# Patient Record
Sex: FEMALE | Race: WHITE | NOT HISPANIC OR LATINO | Employment: FULL TIME | URBAN - METROPOLITAN AREA
[De-identification: names, ages, dates, MRNs, and addresses within clinical notes are randomized per-mention and may not be internally consistent; named-entity substitution may affect disease eponyms.]

---

## 2021-10-25 ENCOUNTER — TELEPHONE (OUTPATIENT)
Dept: FAMILY MEDICINE | Facility: CLINIC | Age: 36
End: 2021-10-25

## 2021-10-25 NOTE — TELEPHONE ENCOUNTER
Patient states she has no preference which OB she goes to. As long as provider is a female. Patient scheduled w/ Dr. Olvera on 11/05 at 7:50 as Dr. Olvera's schedules in clinic works better with patient.

## 2021-10-25 NOTE — TELEPHONE ENCOUNTER
Reason for Call:  Other appointment    Detailed comments: Pregnancy confirmation appointment is needed and she will be a new patient as well.  Last day of her last cycle was 9/19 and she did multiple at home test last week that were all positive     Phone Number Patient can be reached at: Home number on file [phone] (home)    Best Time: any    Can we leave a detailed message on this number? YES    Call taken on 10/25/2021 at 3:15 PM by Maria A Delgadillo

## 2021-11-21 ENCOUNTER — HEALTH MAINTENANCE LETTER (OUTPATIENT)
Age: 36
End: 2021-11-21

## 2021-11-29 ENCOUNTER — NURSE TRIAGE (OUTPATIENT)
Dept: NURSING | Facility: CLINIC | Age: 36
End: 2021-11-29
Payer: COMMERCIAL

## 2021-11-29 NOTE — TELEPHONE ENCOUNTER
Patient has first OB appointment tomorrow at [facility].    Cramping and bleeding.Patient reports she has noticed about a half of a teaspoon of blood on the toilet tissue today after urinating. She reports she is 11weeks   Pregnant .    Patient advised this is normal, and to discuss with PCP at appointment tomorrow.    Lidia Villegas RN RN  [facility] Triage/refill nurse      Reason for Disposition    SPOTTING (single or brief episode)    Additional Information    Negative: Shock suspected (e.g., cold/pale/clammy skin, too weak to stand, low BP, rapid pulse)    Negative: Difficult to awaken or acting confused (e.g., disoriented, slurred speech)    Negative: Passed out (i.e., fainted, collapsed and was not responding)    Negative: Sounds like a life-threatening emergency to the triager    Negative: Vaginal bleeding and pregnant > 20 weeks    Negative: Not pregnant or pregnancy status unknown    Negative: SEVERE abdominal pain (e.g., excruciating)    Negative: SEVERE vaginal bleeding (e.g., soaking 2 pads / hour, large blood clots) and present for 2 or more hours    Negative: SEVERE dizziness (e.g., unable to stand, requires support to walk, feels like passing out)    Negative: MODERATE vaginal bleeding (e.g., soaking 1 pad) and present > 6 hours    Negative: MODERATE vaginal bleeding (e.g., soaking 1 pad / hour, clots) and pregnant > 12 weeks    Negative: Passed tissue (e.g., gray-white)    Negative: Shoulder pain    Negative: Constant abdominal pain lasting > 1 hour    Negative: Fever > 100.4 F (38.0 C)    Negative: Pale skin (pallor) of new onset or worsening    Negative: Patient sounds very sick or weak to the triager    Negative: MODERATE vaginal bleeding (e.g., soaking 1 pad / hour; clots)    Negative: Intermittent lower abdominal pain (e.g., cramping) lasting > 24 hours    Negative: Pain or burning with passing urine (urination)    Negative: Prior history of 'ectopic pregnancy' or previous tubal surgery  (e.g., tubal ligation)    Negative: Patient wants to be seen    Negative: MILD vaginal bleeding (i.e., less than 1 pad / hour; less than patient's usual menstrual bleeding; not just spotting)    Negative: SPOTTING lasting > 48 hours or spotting happens more than once in a week    Negative: Has IUD    Negative: Unusual vaginal discharge (e.g., bad smelling, yellow, green, or foamy-white)    Negative: Not feeling pregnant any longer (e.g., breast tenderness or nausea has disappeared)    Protocols used: PREGNANCY - VAGINAL BLEEDING LESS THAN 20 WEEKS EGA-A-OH

## 2021-11-30 ENCOUNTER — PRENATAL OFFICE VISIT (OUTPATIENT)
Dept: FAMILY MEDICINE | Facility: CLINIC | Age: 36
End: 2021-11-30
Payer: COMMERCIAL

## 2021-11-30 ENCOUNTER — HOSPITAL ENCOUNTER (OUTPATIENT)
Dept: ULTRASOUND IMAGING | Facility: CLINIC | Age: 36
Discharge: HOME | End: 2021-11-30
Attending: FAMILY MEDICINE | Admitting: FAMILY MEDICINE
Payer: COMMERCIAL

## 2021-11-30 VITALS
DIASTOLIC BLOOD PRESSURE: 60 MMHG | HEART RATE: 76 BPM | BODY MASS INDEX: 30.55 KG/M2 | SYSTOLIC BLOOD PRESSURE: 94 MMHG | WEIGHT: 190.1 LBS | HEIGHT: 66 IN

## 2021-11-30 DIAGNOSIS — F41.8 POSTPARTUM ANXIETY: ICD-10-CM

## 2021-11-30 DIAGNOSIS — E89.0 POST-SURGICAL HYPOTHYROIDISM: ICD-10-CM

## 2021-11-30 DIAGNOSIS — O09.529 SUPERVISION OF HIGH-RISK PREGNANCY OF ELDERLY MULTIGRAVIDA: Primary | ICD-10-CM

## 2021-11-30 DIAGNOSIS — O09.529 SUPERVISION OF HIGH-RISK PREGNANCY OF ELDERLY MULTIGRAVIDA: ICD-10-CM

## 2021-11-30 DIAGNOSIS — O20.0 MISCARRIAGE, THREATENED, EARLY PREGNANCY: ICD-10-CM

## 2021-11-30 DIAGNOSIS — O20.9 FIRST TRIMESTER BLEEDING: ICD-10-CM

## 2021-11-30 PROBLEM — E03.9 HYPOTHYROIDISM: Status: ACTIVE | Noted: 2020-10-13

## 2021-11-30 PROBLEM — K58.9 IBS (IRRITABLE BOWEL SYNDROME): Status: ACTIVE | Noted: 2021-11-30

## 2021-11-30 LAB — HCG UR QL: POSITIVE

## 2021-11-30 PROCEDURE — 87086 URINE CULTURE/COLONY COUNT: CPT | Performed by: FAMILY MEDICINE

## 2021-11-30 PROCEDURE — 76801 OB US < 14 WKS SINGLE FETUS: CPT

## 2021-11-30 PROCEDURE — 90686 IIV4 VACC NO PRSV 0.5 ML IM: CPT | Performed by: FAMILY MEDICINE

## 2021-11-30 PROCEDURE — 81025 URINE PREGNANCY TEST: CPT | Performed by: FAMILY MEDICINE

## 2021-11-30 PROCEDURE — 90471 IMMUNIZATION ADMIN: CPT | Performed by: FAMILY MEDICINE

## 2021-11-30 PROCEDURE — 99215 OFFICE O/P EST HI 40 MIN: CPT | Mod: 25 | Performed by: FAMILY MEDICINE

## 2021-11-30 RX ORDER — LEVOTHYROXINE SODIUM 150 UG/1
150 TABLET ORAL DAILY
COMMUNITY
Start: 2021-10-19

## 2021-11-30 RX ORDER — CHOLECALCIFEROL (VITAMIN D3) 50 MCG
1 TABLET ORAL DAILY
Qty: 90 TABLET | Refills: 3
Start: 2021-11-30

## 2021-11-30 RX ORDER — SERTRALINE HYDROCHLORIDE 25 MG/1
25 TABLET, FILM COATED ORAL DAILY
COMMUNITY
Start: 2021-11-21

## 2021-11-30 ASSESSMENT — ANXIETY QUESTIONNAIRES
3. WORRYING TOO MUCH ABOUT DIFFERENT THINGS: NOT AT ALL
5. BEING SO RESTLESS THAT IT IS HARD TO SIT STILL: NOT AT ALL
IF YOU CHECKED OFF ANY PROBLEMS ON THIS QUESTIONNAIRE, HOW DIFFICULT HAVE THESE PROBLEMS MADE IT FOR YOU TO DO YOUR WORK, TAKE CARE OF THINGS AT HOME, OR GET ALONG WITH OTHER PEOPLE: NOT DIFFICULT AT ALL
4. TROUBLE RELAXING: NOT AT ALL
7. FEELING AFRAID AS IF SOMETHING AWFUL MIGHT HAPPEN: SEVERAL DAYS
GAD7 TOTAL SCORE: 2
1. FEELING NERVOUS, ANXIOUS, OR ON EDGE: NOT AT ALL
2. NOT BEING ABLE TO STOP OR CONTROL WORRYING: NOT AT ALL
6. BECOMING EASILY ANNOYED OR IRRITABLE: SEVERAL DAYS

## 2021-11-30 ASSESSMENT — MIFFLIN-ST. JEOR: SCORE: 1551.91

## 2021-11-30 NOTE — PROGRESS NOTES
SUBJECTIVE:    36 year old, female  advance maternal agae   who presents to the clinic today for a new ob visit.    Feels well but started spotting cramping 3 days ago currently no bleeding or cramping . Has started PNV.  Estimated Date of Delivery:  is calculated from Patient's last menstrual period was 09/15/2021 (exact date)..    She has had bleeding since her LMP.  The bleeding  is dark red and brown, in small, on 3  occasions, and was accompanied by cramping...   She has had mild nausea. Weight loss has not occurred.   This was a planned pregnancy.   FOB is involved,         OTHER CONCERNS: hypothyroidism and generalized anxiety disorder which is well controlled currently on medication     ===========================================  ROS  PSYCHIATRIC:  Denies post partum depression  PHQ9: Last PHQ-9 score on record= No Value exists for the : HP#PHQ9  Social History     Tobacco Use     Smoking status: Former Smoker     Types: Cigarettes     Smokeless tobacco: Never Used     Tobacco comment: Cigarette smoker in college years   Substance Use Topics     Alcohol use: Not on file     History   Drug Use Not on file     History   Smoking Status     Former Smoker     Types: Cigarettes   Smokeless Tobacco     Never Used     Comment: Cigarette smoker in college years     Social History    Substance and Sexual Activity      Alcohol use: Not on file    History reviewed. No pertinent family history.  ============================================  MEDICAL HISTORY   No Known Allergies    [unfilled]      Current Outpatient Medications:      levothyroxine (SYNTHROID/LEVOTHROID) 150 MCG tablet, Take 150 mcg by mouth daily, Disp: , Rfl:      Prenatal Vit-Fe Fumarate-FA (PNV FOLIC ACID + IRON) 27-1 MG TABS, Take 1 tablet by mouth daily, Disp: 90 tablet, Rfl: 0     sertraline (ZOLOFT) 25 MG tablet, Take 25 mg by mouth daily, Disp: , Rfl:      vitamin D3 (CHOLECALCIFEROL) 50 mcg (2000 units)  "tablet, Take 1 tablet (50 mcg) by mouth daily, Disp: 90 tablet, Rfl: 3    History reviewed. No pertinent past medical history.    History reviewed. No pertinent surgical history.         OB History    Para Term  AB Living   2 1 1 0 0 1   SAB IAB Ectopic Multiple Live Births   0 0 0 0 1      # Outcome Date GA Lbr Tobi/2nd Weight Sex Delivery Anes PTL Lv   2 Current            1 Term 19 41w0d  4.167 kg (9 lb 3 oz) M  EPI N ELISABETH      Birth Comments: delivered in [facility] close to [place]       Complications: Dysfunctional Labor      Name: Juan Carlos Enrique John        GYN History- NO  Abnormal Pap Smears per patient                         Cervical procedures: none                         History of STI: none     I personally reviewed the past social/family/medical and surgical history on the date of service.   I reviewed lab work done at Intake visit with patient.    OBJECTIVE:   PHYSICAL EXAM:  BP 94/60 (BP Location: Left arm, Patient Position: Sitting, Cuff Size: Adult Regular)   Pulse 76   Ht 1.665 m (5' 5.55\")   Wt 86.2 kg (190 lb 1.6 oz)   LMP 09/15/2021 (Exact Date)   Breastfeeding No   BMI 31.10 kg/m    BMI- Body mass index is 31.1 kg/m ., GENERAL:  Pleasant pregnant female, alert, cooperative  and well groomed.    PHYSICAL EXAM  General: Alert, no acute distress.   EYES normocephalic conjunctivae are trevon,   EAR Normal pearly TMs bilaterally without erythema, pus or fluid  Nose is clear.  Oropharynx is moist and clear,   Neck: supple without adenopathy or thyromegaly.  Lungs: Good aeration bilaterally.Clear to auscultation without wheezes, rales or rhonci.    Heart: regular rate and rhythm, normal S1 and S2, no murmurs  Pelvic exam: no pelvic exam done with history of bleeding like to do ultrasound first   Skin: clear without rash or lesions  Neuro: normal muscle tone in all 4 extremities, deep tendon reflexes 2+ symmetrically at the patella     Intrauterine pregnancy  10w6d  "     Genetic Screening: First Trimester Screen    Marga was seen today for confirmation of pregnancy and establish care.    Diagnoses and all orders for this visit:    Supervision of high-risk pregnancy of elderly multigravida  Comments:  will do prenatal lab and NIPT test next wk after conformation of pregnancy and dates by next wk , will be coming back for lab only  Orders:  -     ABO/Rh type and screen; Future  -     Hepatitis B surface antigen; Future  -     CBC with platelets; Future  -     HIV Antigen Antibody Combo; Future  -     Rubella Antibody IgG; Future  -     Treponema Abs w Reflex to RPR and Titer; Future  -     Urine Culture Aerobic Bacterial; Future  -     Hepatitis C antibody; Future  -     HCG qualitative urine; Future  -     TSH with free T4 reflex; Future  -     US OB <14 Weeks w Transvaginal Single; Future  -     INFLUENZA VACCINE IM >6 MO VALENT IIV4 (ALFURIA/FLUZONE)  -     Other Laboratory; [facility]; NIPT (Laboratory Miscellaneous Order); Future  -     Prenatal Vit-Fe Fumarate-FA (PNV FOLIC ACID + IRON) 27-1 MG TABS; Take 1 tablet by mouth daily  -     vitamin D3 (CHOLECALCIFEROL) 50 mcg (2000 units) tablet; Take 1 tablet (50 mcg) by mouth daily  -     Urine Culture Aerobic Bacterial  -     HCG qualitative urine    Postpartum anxiety  Comments:  continue zoloft at 25 mg working well    Post-surgical hypothyroidism  Comments:  currently taking 150 mcg daily with one extra pill on sunday ( total dose 8 pills ), like to keep TSH level less then 2.5 last check 10 days ago when dose incre    First trimester bleeding  Comments:  started 3 days ago with cramping, no sex or any other trauma , no bleeding or cramping  today just spotting , will do OB ultrasound to conform FHT , if no concern will do PRENATAL lab and NIPT in one wk    Other orders  -     DE FLU VAC PRESRV FREE QUAD SPLIT VIR IM  MONTHS IM         PLAN:  Orders Placed This Encounter   Procedures     US OB <14 Weeks w Transvaginal Single      INFLUENZA VACCINE IM >6 MO VALENT IIV4 (ALFURIA/FLUZONE)     OH FLU VAC PRESRV FREE QUAD SPLIT VIR IM  MONTHS IM     Hepatitis B surface antigen     CBC with platelets     HIV Antigen Antibody Combo     Rubella Antibody IgG     Treponema Abs w Reflex to RPR and Titer     Hepatitis C antibody     HCG qualitative urine     TSH with free T4 reflex     Other Laboratory; [facility]; NIPT (Laboratory Miscellaneous Order)     Orders Placed This Encounter   Medications     levothyroxine (SYNTHROID/LEVOTHROID) 150 MCG tablet     Sig: Take 150 mcg by mouth daily     sertraline (ZOLOFT) 25 MG tablet     Sig: Take 25 mg by mouth daily     Prenatal Vit-Fe Fumarate-FA (PNV FOLIC ACID + IRON) 27-1 MG TABS     Sig: Take 1 tablet by mouth daily     Dispense:  90 tablet     Refill:  0     vitamin D3 (CHOLECALCIFEROL) 50 mcg (2000 units) tablet     Sig: Take 1 tablet (50 mcg) by mouth daily     Dispense:  90 tablet     Refill:  3     - Reviewed use of triage nurse line and contacting the on-call provider after hours for an urgent need such as fever, vagina bleeding, bladder or vaginal infection, rupture of membranes,  or term labor.    - Reviewed best evidence for: weight gain for her weight and height for pregnancy:  RECOMMENDED WEIGHT GAIN: 15-25 lbs.   healthy diet and foods to avoid; exercise and activity during pregnancy;avoiding exposure to toxoplasmosis; and maintenance of a generally healthy lifestyle.   - Discussed the harms, benefits, side effects and alternative therapies for current prescribed and OTC medications.    - All pt's and FOB's  questions discussed and answered.  Pt verbalized understanding of and agreement to plan of care.     - Continue scheduled prenatal care and prn if questions or concerns    Shannan Parker MD 2021 7:35 AM   [facility].  [phone]

## 2021-11-30 NOTE — PATIENT INSTRUCTIONS
Doctor to patient education points discussed during this prenatal visit:    Dear [unfilled]  Congratulations !!  You are currently at 10w6d based on you Patient's last menstrual period was 09/15/2021 (exact date). and we calculate your Estimated Date of Delivery: Jun 22, 2022   At today's visit we ordered  basic prenatal labs, will order dating ultrasound which usually done between 6-8wk of gestational age , order prenatal vitamin and also if needed medications to help nausea/vomiting     Timeline for prenatal visits: I will see you every 4 weeks until 28 weeks, then every 2- weeks until 36 weeks, then every week until 40 weeks (your due date). If you would like to book more than one visit in advance, you can do so , then you can have more preferred appointment times.    Sign up for weekly parenting E mail   Reference it frequently during  your pregnancy and ask me any questions that arise from the book.      -  Hospital: [facility] [place] [facility] Hospital Website:       -  Doctor at delivery: The plan is for me to follow you through your pregnancy and be there at your delivery.  If I am out of town, or unavailable, I work in a group of about 15 doctors and someone from that group will cover for me.  But, again, the goal is for me to be at your delivery.    -  Timeline for prenatal visits: I will see you every 4 weeks until 28 weeks, then every 2 weeks until 36 weeks, then every week until 40 weeks (your due date).  If you would like to book more than one visit in advance, you can do that so that you have your preferred appointment times.    -  Nutrition: Some people say that when you are pregnant you are eating for two.  Really, the amount of calories needed when pregnant is not that much greater than when not pregnant.  As a result, I recommend focusing on eating as healthy as possible and your body will make sure the baby gets the  "necessary nutrients.     -  Weight gain in pregnancy: The amount of weight to gain in pregnancy depends on a patient's before becoming pregnant.  The usual suggestion is to gain between 20 to 25 pounds.  If you weigh more than the suggested weight for your height prior to getting pregnant, it is suggested that you gain less weight in pregnancy (approximately 15 to 25 pounds).  You gain most of your weight in the second (starts at 14 weeks) and third (starts at 28 weeks) trimesters.    -  Vitamins: It is important to take a prenatal vitamin daily during the pregnancy and continue this after birth if breast feeding.    -  Exercise: It is important to stay active during your pregnancy.   You don't need to buy a gym membership or do intense exercise.  Something as simple as getting out on a walk every day will help.  Labor can be a lot of work, so staying active during your pregnancy will help you be in the right condition for your labor.        -  Ultrasounds: One ultrasound that all pregnant women get is at about 20 weeks to look at the baby's anatomy (brain, heart, kidneys, etc) and growth.  Sometimes we have to do an ultrasound early in pregnancy to confirm the due date.  Sometimes we have to do other ultrasounds in pregnancy if issues arise.      -  Hazards: Avoid smoking, alcohol and recreational drugs during pregnancy.  Avoid overheating (for example with hot tubs or summer heat).  Don't change a cat's litter box due to a potential bacterial that can be in the litter box called toxoplasma.  Avoid certain foods due to the risk of different types of bacteria: raw meat and unpasteurized milk (so milk from the grocery store is okay but not straight from a farm).    -  A special word about cheeses: some soft cheeses (such as feta, Brie, Camembert, blue-veined cheese, and Mexican-style cheese like \"queso key fresco\") are made from unpasteurized milk (also called raw milk) and can carry disease-causing bacteria like " listeria.  The CDC recommends not eating the above cheeses while pregnant.  Always check the label before eating soft cheese to be sure it's pasteurized. If you have any doubt (for example, if it's served at a party and you can't look at the package) don't eat it.  Cottage cheese, ricotta, cream cheese, mozzarella, processed cheese (such as American), and hard cheese (such as cheddar and Parmesan), as well as cultured dairy products like yogurt and buttermilk, are generally considered safe, either because they re made with pasteurized milk or because they're processed in ways that help inhibit the growth of the bacteria.    -  Deli meats: It is recommended that deli meats, lunch meats and hot dogs are all eaten hot (heated up to be steaming hot) and not at refrigerated or room temperatures.    -  Concerning symptoms: If you have any of the following things, contact me or my clinic: vaginal bleeding, cramping that won't go away, abdominal / belly pain, burning with urination, or anything other symptoms that concern you.    -  Medications in pregnancy: it is desired not to give medications during pregnancy, but many times we have to.  Some medications are safer than others.  All over the counter medications will tell you to ask your doctor about the medication if you are pregnant.  We will give you a list of medications that [facility] considers safe during pregnancy.  If the medication is on that list, you may say that you have asked your doctor.      EARLY TESTING OPTIONS TO DETERMINE THE RISK FOR CHROMOSOMAL ABNORMALITY   Testing Options:   We discussed the following options:               Non-invasive Pren[facility] Testing ([facility])t ( [facility] ,Please call your insurance always to determine the coverage     Maternal plasma cell-free DNA testing; first trimester ultrasound with nuchal translucency and nasal bone assessment is recommended, when appropriate    Screens for fetal trisomy 21, trisomy 13, trisomy 18, and sex  chromosome aneuploidy    Cannot screen for open neural tube defects; maternal serum AFP after 15 weeks is recommended                  Genetic Amniocentesis    Invasive procedure typically performed in the second trimester by which amniotic fluid is obtained for the purpose of chromosome analysis and/or other prenatal genetic analysis    Diagnostic results; >99% sensitivity for fetal chromosome abnormalities    AFAFP measurement tests for open neural tube defects           Comprehensive (Level II) ultrasound: Detailed ultrasound performed between 18-22 weeks gestation to screen for major birth defects and markers for aneuploidy.        We reviewed the benefits and limitations of this testing.  Screening tests provide a risk assessment specific to the pregnancy for certain fetal chromosome abnormalities, but cannot definitively diagnose or exclude a fetal chromosome abnormality.  Follow-up genetic counseling and consideration of diagnostic testing is recommended with any abnormal screening result.      Diagnostic tests carry inherent risks- including risk of miscarriage- that require careful consideration.  These tests can detect fetal chromosome abnormalities with greater than 99% certainty.  Results can be compromised by maternal cell contamination or mosaicism, and are limited by the resolution of cytogenetic G-banding technology.  There is no screening nor diagnostic test that can detect all forms of birth defects or mental disability.      This is optional testing and the decision to do this or not do this is different for everyone.  There is not one correct choice for everyone.  The right choice for you is the choice that best suits you.  I help guide a patient to make  right choice for her by reviewing her risk factors and her family history   For example, the risk for down syndrome increases with age, >35 higher risk than a 25 year old patient.   Shannan Parker MD

## 2021-12-01 LAB — BACTERIA UR CULT: NO GROWTH

## 2021-12-01 ASSESSMENT — PATIENT HEALTH QUESTIONNAIRE - PHQ9: SUM OF ALL RESPONSES TO PHQ QUESTIONS 1-9: 3

## 2021-12-03 ENCOUNTER — NURSE TRIAGE (OUTPATIENT)
Dept: NURSING | Facility: CLINIC | Age: 36
End: 2021-12-03
Payer: COMMERCIAL

## 2021-12-03 ENCOUNTER — HOSPITAL ENCOUNTER (EMERGENCY)
Facility: CLINIC | Age: 36
Discharge: HOME | End: 2021-12-03
Attending: STUDENT IN AN ORGANIZED HEALTH CARE EDUCATION/TRAINING PROGRAM | Admitting: STUDENT IN AN ORGANIZED HEALTH CARE EDUCATION/TRAINING PROGRAM
Payer: COMMERCIAL

## 2021-12-03 ENCOUNTER — APPOINTMENT (OUTPATIENT)
Dept: ULTRASOUND IMAGING | Facility: CLINIC | Age: 36
End: 2021-12-03
Payer: COMMERCIAL

## 2021-12-03 VITALS
DIASTOLIC BLOOD PRESSURE: 63 MMHG | OXYGEN SATURATION: 97 % | HEART RATE: 84 BPM | RESPIRATION RATE: 18 BRPM | TEMPERATURE: 97.8 F | BODY MASS INDEX: 31.09 KG/M2 | WEIGHT: 190 LBS | SYSTOLIC BLOOD PRESSURE: 105 MMHG

## 2021-12-03 DIAGNOSIS — N93.9 VAGINAL BLEEDING: ICD-10-CM

## 2021-12-03 DIAGNOSIS — O03.9 MISCARRIAGE: ICD-10-CM

## 2021-12-03 LAB
ABO/RH(D): NORMAL
ALBUMIN UR-MCNC: 10 MG/DL
ANTIBODY SCREEN: NEGATIVE
APPEARANCE UR: CLEAR
BILIRUB UR QL STRIP: NEGATIVE
COLOR UR AUTO: YELLOW
ERYTHROCYTE [DISTWIDTH] IN BLOOD BY AUTOMATED COUNT: 12.4 % (ref 10–15)
GLUCOSE UR STRIP-MCNC: NEGATIVE MG/DL
HCG SERPL-ACNC: 3900 MLU/ML (ref 0–4)
HCT VFR BLD AUTO: 35.2 % (ref 35–47)
HGB BLD-MCNC: 12.3 G/DL (ref 11.7–15.7)
HGB BLD-MCNC: 12.3 G/DL (ref 11.7–15.7)
HGB UR QL STRIP: ABNORMAL
INR PPP: 1.01 (ref 0.85–1.15)
KETONES UR STRIP-MCNC: NEGATIVE MG/DL
LEUKOCYTE ESTERASE UR QL STRIP: NEGATIVE
MCH RBC QN AUTO: 32.2 PG (ref 26.5–33)
MCHC RBC AUTO-ENTMCNC: 34.9 G/DL (ref 31.5–36.5)
MCV RBC AUTO: 92 FL (ref 78–100)
MUCOUS THREADS #/AREA URNS LPF: PRESENT /LPF
NITRATE UR QL: NEGATIVE
PH UR STRIP: 6 [PH] (ref 5–7)
PLATELET # BLD AUTO: 189 10E3/UL (ref 150–450)
RBC # BLD AUTO: 3.82 10E6/UL (ref 3.8–5.2)
RBC URINE: 77 /HPF
SP GR UR STRIP: 1.03 (ref 1–1.03)
SPECIMEN EXPIRATION DATE: NORMAL
SQUAMOUS EPITHELIAL: <1 /HPF
UROBILINOGEN UR STRIP-MCNC: <2 MG/DL
WBC # BLD AUTO: 9.6 10E3/UL (ref 4–11)
WBC URINE: 2 /HPF

## 2021-12-03 PROCEDURE — 84702 CHORIONIC GONADOTROPIN TEST: CPT | Performed by: PHYSICIAN ASSISTANT

## 2021-12-03 PROCEDURE — 81001 URINALYSIS AUTO W/SCOPE: CPT | Performed by: PHYSICIAN ASSISTANT

## 2021-12-03 PROCEDURE — 36415 COLL VENOUS BLD VENIPUNCTURE: CPT | Performed by: PHYSICIAN ASSISTANT

## 2021-12-03 PROCEDURE — 85027 COMPLETE CBC AUTOMATED: CPT | Performed by: PHYSICIAN ASSISTANT

## 2021-12-03 PROCEDURE — 36415 COLL VENOUS BLD VENIPUNCTURE: CPT | Performed by: STUDENT IN AN ORGANIZED HEALTH CARE EDUCATION/TRAINING PROGRAM

## 2021-12-03 PROCEDURE — 76801 OB US < 14 WKS SINGLE FETUS: CPT

## 2021-12-03 PROCEDURE — 99284 EMERGENCY DEPT VISIT MOD MDM: CPT | Mod: 25

## 2021-12-03 PROCEDURE — 86901 BLOOD TYPING SEROLOGIC RH(D): CPT | Performed by: PHYSICIAN ASSISTANT

## 2021-12-03 PROCEDURE — 85018 HEMOGLOBIN: CPT | Mod: 59 | Performed by: STUDENT IN AN ORGANIZED HEALTH CARE EDUCATION/TRAINING PROGRAM

## 2021-12-03 PROCEDURE — 85610 PROTHROMBIN TIME: CPT | Performed by: PHYSICIAN ASSISTANT

## 2021-12-03 NOTE — TELEPHONE ENCOUNTER
Patient says she is having a miscarriage and is having intense vaginal bleeding. Patient says the bleeding started during the night, and is soaking one pad an hour along with severe abdominal pain. Triage guidelines recommend to go to ED now. Caller verbalized and understands directives.  COVID 19 Nurse Triage Plan/Patient Instructions    Please be aware that novel coronavirus (COVID-19) may be circulating in the community. If you develop symptoms such as fever, cough, or SOB or if you have concerns about the presence of another infection including coronavirus (COVID-19), please contact your health care provider or visit [email or web address].     Disposition/Instructions    ED Visit recommended. Follow protocol based instructions.     Bring Your Own Device:  Please also bring your smart device(s) (smart phones, tablets, laptops) and their charging cables for your personal use and to communicate with your care team during your visit.    Thank you for taking steps to prevent the spread of this virus.  o Limit your contact with others.  o Wear a simple mask to cover your cough.  o Wash your hands well and often.    Resources    [facility]: About COVID-19: [email or web address]    CDC: What to Do If You're Sick: www.cdc.gov/coronavirus/2019-ncov/about/steps-when-sick.html    CDC: Ending Home Isolation: www.cdc.gov/coronavirus/2019-ncov/hcp/disposition-in-home-patients.html     CDC: Caring for Someone: www.cdc.gov/coronavirus/2019-ncov/if-you-are-sick/care-for-someone.html     [facility]: Interim Guidance for Hospital Discharge to Home: www.health.[email or web address].mn.us/diseases/coronavirus/hcp/hospdischarge.pdf    [facility] clinical trials (COVID-19 research studies): [email or web address]     Below are the COVID-19 hotlines at the [facility] ([facility]). Interpreters are available.   o For health questions: Call 880-168-4173 or 1-384.615.4769 (7 a.m. to 7  p.m.)  o For questions about schools and childcare: Call 775-002-4141 or 1-118.601.3321 (7 a.m. to 7 p.m.)                     Reason for Disposition    SEVERE abdominal pain    MODERATE vaginal bleeding (e.g., soaking 1 pad per hour; clots) and present > 6 hours    Additional Information    Negative: Shock suspected (e.g., cold/pale/clammy skin, too weak to stand, low BP, rapid pulse)    Negative: Difficult to awaken or acting confused (e.g., disoriented, slurred speech)    Negative: Passed out (i.e., lost consciousness, collapsed and was not responding)    Negative: Sounds like a life-threatening emergency to the triager    Negative: Threatened miscarriage (threatened ) suspected and has not been examined by a HCP    Negative: Vaginal bleeding is main symptom and more than 4 weeks since medical visit    Negative: Abdomen pain is main symptom and NO vaginal bleeding in past 24 hours    Negative: Not pregnant or pregnancy status unknown    Negative: SEVERE vaginal bleeding (e.g., soaking 2 pads per hour, large blood clots) and present 2 or more hours    Protocols used:  - THREATENED MISCARRIAGE FOLLOW-UP CALL-A-OH

## 2021-12-03 NOTE — ED TRIAGE NOTES
Pt presents to the ED with c/o vaginal bleeding and cramps since last night. Pt states bleeding started 11/27/21 but  was more severe last night with clots and today has improved. Pt states she was 11 weeks pregnant and had ultrasound done Monday of this week.

## 2021-12-04 NOTE — ED PROVIDER NOTES
EMERGENCY DEPARTMENT ENCOUNTER       ED Course & Medical Decision Making     Final Impression  36 year old female presents for evaluation of vaginal bleeding in setting of suspected ongoing miscarriage. Patient had her first OB appointment and ultrasound on 11/30, believes she was about 10-11 weeks by LMP, though the ultrasound returned showing a gestational sac measuring 7 weeks, though did not have any visible fetal pole, suggestive of anembryonic pregnancy. Patient states that her OB doctor did discuss this result with her. Has had some spotting since 11/27, increased to more significant bleeding last night where she soaked through multiple pads. Has passed a fairly large clots since arriving in the ED. Bleeding improved for a while, has now filled another pad, though denies feeling lightheaded or dizzy. Appears hemodynamically stable on exam, not tachycardic. Initial hemoglobin 12.3 at about 1:20 PM. Rechecked at 7:30 PM and remained at 12.3. Pelvic ultrasound showed complex heterogeneous debris in the endometrium consistent with clot, though this was before patient passed a large clot in the ED. Clinically well-appearing. Hemoglobin appears stable, patient self remains hemodynamically stable. Suspect she has passed large amount of clot, though did  patient that she will likely have continued bleeding for several days. Spent time at bedside discussing specific return precautions including significantly worsening bleeding, lightheadedness, dizziness, syncope with patient and her significant other. Will discharge home.    Prior to making a final disposition on this patient the results of patient's tests and other diagnostic studies were discussed with the patient. All questions were answered. Patient expressed understanding of the plan and was amenable to it.    Medications - No data to display    Final Impression     1. Miscarriage    2. Vaginal bleeding      Chief Complaint     Chief Complaint   Patient  presents with     Miscarriage     Vaginal Bleeding     Pt presents to the ED with c/o vaginal bleeding and cramps since last night. Pt states bleeding started 21 but  was more severe last night with clots and today has improved. Pt states she was 11 weeks pregnant and had ultrasound done Monday of this week.    PATEL Blue is a  36 year old female with a history of hypothyroidism and IBS who presents for evaluation of vaginal bleeding.    Per chart review, patient was seen by her OB/GYN on  where she noted dark red/brown spotting and abdominal cramping that began three days prior. Her last menstrual period was on 9/15/2021. Urine hCG was positive at that time. US was obtained and showed a single intrauterine gestational sac with estimated gestational age of 7 weeks 0 days without identifiable fetal pole, possibly representing an anembryonic pregnancy.     Patient reports vaginal bleeding that began on . It started as light bleeding when wiping but progressively increased. Last night patient reports a significant amount of bleeding, soaking through several pads, her pants, and her mattress pad. She endorses associated diffuse cramping abdominal pain, fatigue, nausea, diarrhea, constipation, and intermittent chills. Her last bowel movement was earlier today. Denies vomiting, fever, cough, or rhinorrhea. Patient states her bleeding had decreased throughout today, however it has since increased again since being here in the ED. She reports soaking through one pad in the last 45 minutes as well as passing a large clot.     I, Vanda Trammell am serving as a scribe to document services personally performed by Dr. Bacilio Paniagua MD, based on my observation and the provider's statements to me. I, Dr. Bacilio Paniagua MD attest that Vanda Trammell is acting in a scribe capacity, has observed my performance of the services and has documented them in accordance with my direction.    Past  Medical History     No past medical history on file.  No past surgical history on file.  No family history on file.   Social History     Tobacco Use     Smoking status: Former Smoker     Types: Cigarettes     Smokeless tobacco: Never Used     Tobacco comment: Cigarette smoker in college years   Substance Use Topics     Alcohol use: Not on file     Drug use: Not on file       Relevant past medical, surgical, family and social history as documented above, has been reviewed and discussed with patient. No changes or additions, unless otherwise noted in the HPI.    Current Medications     levothyroxine (SYNTHROID/LEVOTHROID) 150 MCG tablet  Prenatal Vit-Fe Fumarate-FA (PNV FOLIC ACID + IRON) 27-1 MG TABS  sertraline (ZOLOFT) 25 MG tablet  vitamin D3 (CHOLECALCIFEROL) 50 mcg (2000 units) tablet      Allergies     No Known Allergies    Review of Systems     Constitutional: Endorses chills and fatigue. Denies fever  Eyes: Denies visual changes  Respiratory: Denies shortness of breath    Cardiovascular: Denies chest pain  GI: Endorses diffuse abdominal pain, nausea, diarrhea, and constipation. Denies vomiting or dark, bloody stools  : Endorses vaginal bleeding. Denies hematuria, dysuria, or flank pain  Musculoskeletal: Denies any new back pain  Skin: Denies rashes or wound    Remainder of systems reviewed, unless noted in HPI all others negative.    Physical Exam     /83   Pulse 81   Temp 97.8  F (36.6  C) (Temporal)   Resp 18   Wt 86.2 kg (190 lb)   LMP 09/15/2021 (Exact Date)   SpO2 99%   BMI 31.09 kg/m    Constitutional: Awake, alert, in no acute distress  Head: Normocephalic, atraumatic.  ENT: Mucous membranes moist.   Eyes: PERRL, Conjunctiva normal  Respiratory: Respirations even, unlabored, in no acute respiratory distress.  Cardiovascular: Regular rate and rhythm. +2 radial pulses, equal bilaterally.   GI: Abdomen soft, mild lower abdominal tenderness bilaterally, no guarding or  rebound.  Musculoskeletal: Moves all 4 extremities equally, strength symmetrical on bilateral uppers and lowers.  Integument: Warm, dry.   Neurologic: Alert & oriented x 3. Normal speech. Grossly normal motor and sensory function. No focal deficits noted.  Psychiatric: Normal mood and affect.    Labs & Imaging     Results for orders placed or performed during the hospital encounter of 21   US OB <14 Weeks w Transvaginal Single    Impression    IMPRESSION:  Complex heterogeneous debris within the endometrium. These findings are compatible with spontaneous .       CBC with platelets   Result Value Ref Range    WBC Count 9.6 4.0 - 11.0 10e3/uL    RBC Count 3.82 3.80 - 5.20 10e6/uL    Hemoglobin 12.3 11.7 - 15.7 g/dL    Hematocrit 35.2 35.0 - 47.0 %    MCV 92 78 - 100 fL    MCH 32.2 26.5 - 33.0 pg    MCHC 34.9 31.5 - 36.5 g/dL    RDW 12.4 10.0 - 15.0 %    Platelet Count 189 150 - 450 10e3/uL   hCG Quantitative Pregnancy   Result Value Ref Range    hCG Quantitative 3,900 (H) 0 - 4 mlU/mL   Result Value Ref Range    INR 1.01 0.85 - 1.15   UA with Microscopic reflex to Culture    Specimen: Urine, Clean Catch   Result Value Ref Range    Color Urine Yellow Colorless, Straw, Light Yellow, Yellow    Appearance Urine Clear Clear    Glucose Urine Negative Negative mg/dL    Bilirubin Urine Negative Negative    Ketones Urine Negative Negative mg/dL    Specific Gravity Urine 1.031 (H) 1.001 - 1.030    Blood Urine 0.5 mg/dL (A) Negative    pH Urine 6.0 5.0 - 7.0    Protein Albumin Urine 10  (A) Negative mg/dL    Urobilinogen Urine <2.0 <2.0 mg/dL    Nitrite Urine Negative Negative    Leukocyte Esterase Urine Negative Negative    Mucus Urine Present (A) None Seen /LPF    RBC Urine 77 (H) <=2 /HPF    WBC Urine 2 <=5 /HPF    Squamous Epithelials Urine <1 <=1 /HPF   Result Value Ref Range    Hemoglobin 12.3 11.7 - 15.7 g/dL   Adult Type and Screen   Result Value Ref Range    ABO/RH(D) A POS     Antibody Screen Negative  Negative    SPECIMEN EXPIRATION DATE [id number]         Bacilio Paniagua MD  12/03/21 2008

## 2021-12-14 ENCOUNTER — HOSPITAL ENCOUNTER (OUTPATIENT)
Dept: ULTRASOUND IMAGING | Facility: CLINIC | Age: 36
Discharge: HOME | End: 2021-12-14
Attending: FAMILY MEDICINE | Admitting: FAMILY MEDICINE
Payer: COMMERCIAL

## 2021-12-14 ENCOUNTER — LAB (OUTPATIENT)
Dept: LAB | Facility: CLINIC | Age: 36
End: 2021-12-14
Payer: COMMERCIAL

## 2021-12-14 DIAGNOSIS — O20.0 MISCARRIAGE, THREATENED, EARLY PREGNANCY: ICD-10-CM

## 2021-12-14 DIAGNOSIS — O20.9 FIRST TRIMESTER BLEEDING: ICD-10-CM

## 2021-12-14 LAB — HCG SERPL-ACNC: 50 MLU/ML (ref 0–4)

## 2021-12-14 PROCEDURE — 36415 COLL VENOUS BLD VENIPUNCTURE: CPT

## 2021-12-14 PROCEDURE — 76801 OB US < 14 WKS SINGLE FETUS: CPT

## 2021-12-14 PROCEDURE — 84702 CHORIONIC GONADOTROPIN TEST: CPT

## 2023-01-08 ENCOUNTER — HEALTH MAINTENANCE LETTER (OUTPATIENT)
Age: 38
End: 2023-01-08

## 2023-07-23 ENCOUNTER — OFFICE VISIT (OUTPATIENT)
Dept: URGENT CARE | Facility: URGENT CARE | Age: 38
End: 2023-07-23
Payer: COMMERCIAL

## 2023-07-23 ENCOUNTER — NURSE TRIAGE (OUTPATIENT)
Dept: NURSING | Facility: CLINIC | Age: 38
End: 2023-07-23
Payer: COMMERCIAL

## 2023-07-23 VITALS
HEIGHT: 65 IN | OXYGEN SATURATION: 97 % | TEMPERATURE: 97.9 F | WEIGHT: 185 LBS | SYSTOLIC BLOOD PRESSURE: 110 MMHG | DIASTOLIC BLOOD PRESSURE: 62 MMHG | BODY MASS INDEX: 30.82 KG/M2 | HEART RATE: 86 BPM | RESPIRATION RATE: 16 BRPM

## 2023-07-23 DIAGNOSIS — S06.0X0A CONCUSSION WITHOUT LOSS OF CONSCIOUSNESS, INITIAL ENCOUNTER: Primary | ICD-10-CM

## 2023-07-23 PROCEDURE — 99213 OFFICE O/P EST LOW 20 MIN: CPT | Performed by: FAMILY MEDICINE

## 2023-07-23 NOTE — PROGRESS NOTES
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  This afternoon she almost had her first opportunity to just relax and that is when she realized that things are not quite right.  There was a bump on the right side of her head from where she hit and it is still a little swollen.  Hearing and vision are fine.  Memory is normal.    Objective: Normal thought processes and range of affect.  ENT is normal.  There may be a little pain in the TMJ area.  The neck is slightly tight but not particularly tender.  Neurologic exam is within normal limits including fundi and Romberg's.  She does have strabismus that is not new.    Assessment and plan: Probably a mild concussion.  Unfortunately she has not really been able to do any brain rest but she will do the best she can and this should gradually get better.  If it still bad in a couple of weeks, reevaluate.

## 2023-07-23 NOTE — TELEPHONE ENCOUNTER
"Catrina Stephen   I think I got a concussion on Weds when my toddler collided head to head--temple area on the side of my head. Vertigo began yesterday am, I also have irritabiliity, exhaustion and brain fog.No LOC. Sensitivity to light. Headache currently =\"2\" like a tension headache. There is a big bruise, not raised, but >2: in diameter.     Triaged to a disposition of Go to ED now. Patient states she will go to the [facility] now. (Advised that  may also direct her to go to the ER for evaluation ).     Beatris Epstein RN Triage Nurse Advisor 5:50 PM 7/23/2023  Reason for Disposition   Large swelling or bruise > 2 inches (5 cm)    Additional Information   Negative: [1] ACUTE NEURO SYMPTOM AND [2] present now  (DEFINITION: difficult to awaken OR confused thinking and talking OR slurred speech OR weakness of arms OR unsteady walking)   Negative: Knocked out (unconscious) > 1 minute   Negative: Seizure (convulsion) occurred  (Exception: prior history of seizures and now alert and without Acute Neuro Symptoms)   Negative: Penetrating head injury (e.g., knife, gun shot wound, metal object)   Negative: [1] Major bleeding (e.g., actively dripping or spurting) AND [2] can't be stopped   Negative: [1] Dangerous mechanism of injury (e.g., MVA, diving, trampoline, contact sports, fall > 10 feet or 3 meters) AND [2] NECK pain AND [3] began < 1 hour after injury   Negative: Sounds like a life-threatening emergency to the triager   Negative: [1] Diagnosed with concussion AND [2] within last 14 days   Negative: [1] Traumatic brain injury (mTBI; concussion) AND [2] more than 14 days since head injury   Negative: Can't remember what happened (amnesia)   Negative: Vomiting once or more   Negative: [1] Loss of vision or double vision AND [2] present now   Negative: Watery or blood-tinged fluid dripping from the NOSE or EARS now  (Exception: tears from crying or nosebleed from nasal trauma)   Negative: [1] One or two \"black eyes\" " (bruising, purple color of eyelids) AND [2] onset within 24 hours of head injury    Protocols used: Head Injury-A-AH

## 2024-02-10 ENCOUNTER — HEALTH MAINTENANCE LETTER (OUTPATIENT)
Age: 39
End: 2024-02-10

## 2025-03-08 ENCOUNTER — HEALTH MAINTENANCE LETTER (OUTPATIENT)
Age: 40
End: 2025-03-08